# Patient Record
Sex: FEMALE | Race: WHITE | NOT HISPANIC OR LATINO | URBAN - METROPOLITAN AREA
[De-identification: names, ages, dates, MRNs, and addresses within clinical notes are randomized per-mention and may not be internally consistent; named-entity substitution may affect disease eponyms.]

---

## 2020-06-02 ENCOUNTER — OFFICE VISIT (OUTPATIENT)
Dept: URBAN - METROPOLITAN AREA CLINIC 54 | Facility: CLINIC | Age: 50
End: 2020-06-02
Payer: COMMERCIAL

## 2020-06-02 ENCOUNTER — WEB ENCOUNTER (OUTPATIENT)
Dept: URBAN - NONMETROPOLITAN AREA CLINIC 2 | Facility: CLINIC | Age: 50
End: 2020-06-02

## 2020-06-02 DIAGNOSIS — R10.13 EPIGASTRIC ABDOMINAL PAIN: ICD-10-CM

## 2020-06-02 DIAGNOSIS — R63.4 WEIGHT LOSS: ICD-10-CM

## 2020-06-02 DIAGNOSIS — R11.2 NAUSEA & VOMITING: ICD-10-CM

## 2020-06-02 DIAGNOSIS — R19.7 DIARRHEA: ICD-10-CM

## 2020-06-02 PROCEDURE — 99203 OFFICE O/P NEW LOW 30 MIN: CPT | Performed by: INTERNAL MEDICINE

## 2020-06-02 PROCEDURE — 1036F TOBACCO NON-USER: CPT | Performed by: INTERNAL MEDICINE

## 2020-06-02 PROCEDURE — G9903 PT SCRN TBCO ID AS NON USER: HCPCS | Performed by: INTERNAL MEDICINE

## 2020-06-02 RX ORDER — DICYCLOMINE HYDROCHLORIDE 20 MG/1
1 TABLET TABLET ORAL THREE TIMES A DAY
Status: DISCONTINUED | COMMUNITY

## 2020-06-02 RX ORDER — PROMETHAZINE HYDROCHLORIDE 25 MG/1
1 TABLET AS NEEDED TABLET ORAL
Status: DISCONTINUED | COMMUNITY

## 2020-06-02 RX ORDER — ONDANSETRON HYDROCHLORIDE 8 MG/1
1 TABLET AS NEEDED TABLET, FILM COATED ORAL ONCE A DAY
Status: DISCONTINUED | COMMUNITY

## 2020-06-02 RX ORDER — BUSPIRONE HYDROCHLORIDE 10 MG/1
1 TABLET TABLET ORAL TWICE A DAY
Status: DISCONTINUED | COMMUNITY

## 2020-06-02 NOTE — HPI-TODAY'S VISIT:
More than 10 years of epigastric pain, intermittent N/V, 10 lb weight loss. Several months of profuse diarrhea, including nocturnal and incontinence. No bleeding. Prior CT, labs EGD and colonoscopy with [facility], records pending. No fever or cough. General health otherwise OK. Trial of steroids and budesonide in the past, minimally helpful. Unclear if IBD or microscopic colitis or celiac actually confirmed.

## 2020-06-10 ENCOUNTER — TELEPHONE ENCOUNTER (OUTPATIENT)
Dept: URBAN - METROPOLITAN AREA CLINIC 92 | Facility: CLINIC | Age: 50
End: 2020-06-10

## 2020-06-10 RX ORDER — POLYETHYLENE GLYCOL 3350, SODIUM SULFATE, SODIUM CHLORIDE, POTASSIUM CHLORIDE, ASCORBIC ACID, SODIUM ASCORBATE 140-9-5.2G
AS DIRECTED KIT ORAL
Qty: 1 | Refills: 0 | OUTPATIENT
Start: 2020-06-10 | End: 2020-06-11

## 2020-06-24 ENCOUNTER — TELEPHONE ENCOUNTER (OUTPATIENT)
Dept: URBAN - METROPOLITAN AREA SURGERY CENTER 14 | Facility: SURGERY CENTER | Age: 50
End: 2020-06-24

## 2020-08-18 ENCOUNTER — TELEPHONE ENCOUNTER (OUTPATIENT)
Dept: URBAN - METROPOLITAN AREA CLINIC 54 | Facility: CLINIC | Age: 50
End: 2020-08-18

## 2020-08-31 ENCOUNTER — OFFICE VISIT (OUTPATIENT)
Dept: URBAN - METROPOLITAN AREA SURGERY CENTER 14 | Facility: SURGERY CENTER | Age: 50
End: 2020-08-31
Payer: COMMERCIAL

## 2020-08-31 ENCOUNTER — TELEPHONE ENCOUNTER (OUTPATIENT)
Dept: URBAN - METROPOLITAN AREA CLINIC 92 | Facility: CLINIC | Age: 50
End: 2020-08-31

## 2020-08-31 DIAGNOSIS — K29.60 ADENOPAPILLOMATOSIS GASTRICA: ICD-10-CM

## 2020-08-31 DIAGNOSIS — R63.4 ABNORMAL INTENTIONAL WEIGHT LOSS: ICD-10-CM

## 2020-08-31 DIAGNOSIS — K52.9 ACUTE COLITIS: ICD-10-CM

## 2020-08-31 DIAGNOSIS — R19.7 ACUTE DIARRHEA: ICD-10-CM

## 2020-08-31 PROCEDURE — G9937 DIG OR SURV COLSCO: HCPCS | Performed by: INTERNAL MEDICINE

## 2020-08-31 PROCEDURE — 45380 COLONOSCOPY AND BIOPSY: CPT | Performed by: INTERNAL MEDICINE

## 2020-08-31 PROCEDURE — 43239 EGD BIOPSY SINGLE/MULTIPLE: CPT | Performed by: INTERNAL MEDICINE

## 2020-08-31 PROCEDURE — G8907 PT DOC NO EVENTS ON DISCHARG: HCPCS | Performed by: INTERNAL MEDICINE

## 2020-09-10 ENCOUNTER — TELEPHONE ENCOUNTER (OUTPATIENT)
Dept: URBAN - METROPOLITAN AREA CLINIC 54 | Facility: CLINIC | Age: 50
End: 2020-09-10

## 2020-09-14 ENCOUNTER — TELEPHONE ENCOUNTER (OUTPATIENT)
Dept: URBAN - METROPOLITAN AREA CLINIC 54 | Facility: CLINIC | Age: 50
End: 2020-09-14

## 2020-09-21 ENCOUNTER — OFFICE VISIT (OUTPATIENT)
Dept: URBAN - METROPOLITAN AREA SURGERY CENTER 14 | Facility: SURGERY CENTER | Age: 50
End: 2020-09-21

## 2020-09-28 ENCOUNTER — TELEPHONE ENCOUNTER (OUTPATIENT)
Dept: URBAN - METROPOLITAN AREA CLINIC 54 | Facility: CLINIC | Age: 50
End: 2020-09-28

## 2020-10-12 ENCOUNTER — TELEPHONE ENCOUNTER (OUTPATIENT)
Dept: URBAN - METROPOLITAN AREA CLINIC 92 | Facility: CLINIC | Age: 50
End: 2020-10-12

## 2020-10-12 RX ORDER — PROMETHAZINE HYDROCHLORIDE 25 MG/1
1 TABLET AS NEEDED TABLET ORAL
Qty: 60 TABLET

## 2020-10-12 RX ORDER — ONDANSETRON HYDROCHLORIDE 4 MG/1
1 TABLET AS NEEDED TABLET, FILM COATED ORAL TWICE A DAY
Qty: 60

## 2020-10-15 ENCOUNTER — TELEPHONE ENCOUNTER (OUTPATIENT)
Dept: URBAN - METROPOLITAN AREA CLINIC 54 | Facility: CLINIC | Age: 50
End: 2020-10-15

## 2020-10-16 ENCOUNTER — TELEPHONE ENCOUNTER (OUTPATIENT)
Dept: URBAN - METROPOLITAN AREA CLINIC 54 | Facility: CLINIC | Age: 50
End: 2020-10-16

## 2020-10-16 ENCOUNTER — TELEPHONE ENCOUNTER (OUTPATIENT)
Dept: URBAN - METROPOLITAN AREA CLINIC 92 | Facility: CLINIC | Age: 50
End: 2020-10-16

## 2020-10-19 ENCOUNTER — TELEPHONE ENCOUNTER (OUTPATIENT)
Dept: URBAN - METROPOLITAN AREA CLINIC 54 | Facility: CLINIC | Age: 50
End: 2020-10-19

## 2020-12-10 ENCOUNTER — TELEPHONE ENCOUNTER (OUTPATIENT)
Dept: URBAN - METROPOLITAN AREA CLINIC 54 | Facility: CLINIC | Age: 50
End: 2020-12-10

## 2021-09-22 ENCOUNTER — TELEPHONE ENCOUNTER (OUTPATIENT)
Dept: URBAN - METROPOLITAN AREA CLINIC 54 | Facility: CLINIC | Age: 51
End: 2021-09-22

## 2021-10-15 ENCOUNTER — TELEPHONE ENCOUNTER (OUTPATIENT)
Dept: URBAN - METROPOLITAN AREA CLINIC 54 | Facility: CLINIC | Age: 51
End: 2021-10-15

## 2021-10-18 ENCOUNTER — OFFICE VISIT (OUTPATIENT)
Dept: URBAN - METROPOLITAN AREA CLINIC 53 | Facility: CLINIC | Age: 51
End: 2021-10-18
Payer: COMMERCIAL

## 2021-10-18 DIAGNOSIS — K92.2 ACUTE GASTROINTESTINAL BLEEDING: ICD-10-CM

## 2021-10-18 PROCEDURE — 91110 GI TRC IMG INTRAL ESOPH-ILE: CPT | Performed by: INTERNAL MEDICINE

## 2021-10-18 RX ORDER — PROMETHAZINE HYDROCHLORIDE 25 MG/1
1 TABLET AS NEEDED TABLET ORAL
Qty: 60 TABLET | Status: ACTIVE | COMMUNITY

## 2021-10-18 RX ORDER — ONDANSETRON HYDROCHLORIDE 4 MG/1
1 TABLET AS NEEDED TABLET, FILM COATED ORAL TWICE A DAY
Qty: 60 | Status: ACTIVE | COMMUNITY

## 2021-11-11 ENCOUNTER — OFFICE VISIT (OUTPATIENT)
Dept: URBAN - METROPOLITAN AREA CLINIC 54 | Facility: CLINIC | Age: 51
End: 2021-11-11
Payer: COMMERCIAL

## 2021-11-11 ENCOUNTER — TELEPHONE ENCOUNTER (OUTPATIENT)
Dept: URBAN - METROPOLITAN AREA CLINIC 54 | Facility: CLINIC | Age: 51
End: 2021-11-11

## 2021-11-11 ENCOUNTER — OFFICE VISIT (OUTPATIENT)
Dept: URBAN - METROPOLITAN AREA CLINIC 54 | Facility: CLINIC | Age: 51
End: 2021-11-11

## 2021-11-11 ENCOUNTER — WEB ENCOUNTER (OUTPATIENT)
Dept: URBAN - METROPOLITAN AREA CLINIC 54 | Facility: CLINIC | Age: 51
End: 2021-11-11

## 2021-11-11 DIAGNOSIS — R11.2 NAUSEA AND VOMITING, INTRACTABILITY OF VOMITING NOT SPECIFIED, UNSPECIFIED VOMITING TYPE: ICD-10-CM

## 2021-11-11 DIAGNOSIS — K62.89 RECTAL PAIN: ICD-10-CM

## 2021-11-11 DIAGNOSIS — R10.13 EPIGASTRIC PAIN: ICD-10-CM

## 2021-11-11 DIAGNOSIS — R19.7 DIARRHEA, UNSPECIFIED TYPE: ICD-10-CM

## 2021-11-11 PROCEDURE — 99214 OFFICE O/P EST MOD 30 MIN: CPT | Performed by: INTERNAL MEDICINE

## 2021-11-11 RX ORDER — ONDANSETRON HYDROCHLORIDE 4 MG/1
1 TABLET AS NEEDED TABLET, FILM COATED ORAL TWICE A DAY
Qty: 60 | COMMUNITY

## 2021-11-11 RX ORDER — DICYCLOMINE HYDROCHLORIDE 10 MG/1
1 TABLET CAPSULE ORAL THREE TIMES A DAY
Status: ACTIVE | COMMUNITY

## 2021-11-11 RX ORDER — OMEPRAZOLE 40 MG/1
1 CAPSULE 30 MINUTES BEFORE MORNING MEAL CAPSULE, DELAYED RELEASE ORAL ONCE A DAY
Qty: 30 | OUTPATIENT
Start: 2021-11-11

## 2021-11-11 RX ORDER — DIPHENOXYLATE HYDROCHLORIDE AND ATROPINE SULFATE 2.5; .025 MG/1; MG/1
1 TABLET AS NEEDED TABLET ORAL
Qty: 60 | Refills: 1 | OUTPATIENT
Start: 2021-11-11 | End: 2022-01-10

## 2021-11-11 RX ORDER — ONDANSETRON HYDROCHLORIDE 8 MG/1
1 TABLET AS NEEDED TABLET, FILM COATED ORAL TWICE A DAY
Qty: 60

## 2021-11-11 RX ORDER — PROMETHAZINE HYDROCHLORIDE 25 MG/1
1 TABLET AS NEEDED TABLET ORAL
Qty: 60 TABLET | Status: ACTIVE | COMMUNITY

## 2021-11-11 RX ORDER — PROMETHAZINE HYDROCHLORIDE 25 MG/1
1 TABLET AS NEEDED TABLET ORAL
Qty: 60 TABLET | COMMUNITY

## 2021-11-11 RX ORDER — LIDOCAINE 40 MG/G
1 APPLICATION AS NEEDED CREAM TOPICAL THREE TIMES A DAY
OUTPATIENT
Start: 2021-11-11

## 2021-11-11 RX ORDER — BUSPIRONE HYDROCHLORIDE 15 MG/1
1 TABLET TABLET ORAL TWICE A DAY
Status: ACTIVE | COMMUNITY

## 2021-11-11 RX ORDER — DRONABINOL 5 MG/1
3-4 CAPSULES CAPSULE ORAL ONCE A DAY
Status: ACTIVE | COMMUNITY

## 2021-11-11 RX ORDER — ONDANSETRON HYDROCHLORIDE 4 MG/1
1 TABLET AS NEEDED TABLET, FILM COATED ORAL TWICE A DAY
Qty: 60 | Status: ACTIVE | COMMUNITY

## 2021-11-11 NOTE — PHYSICAL EXAM CONSTITUTIONAL:
tearful, well developed, well nourished , in no acute distress , ambulating without difficulty , normal communication ability , visibly upset

## 2021-11-11 NOTE — PHYSICAL EXAM SKIN:
Small, palpable, erythematous lesions on palms of hands that are tender to touch, no jaundice present , good turgor , no masses , no tenderness on palpation

## 2021-11-11 NOTE — PHYSICAL EXAM GASTROINTESTINAL
Abdomen , soft, tenderness to light palpation throughout entire abd, nondistended , no guarding or rigidity , no masses palpable , normal bowel sounds , Liver and Spleen , no hepatomegaly present , no hepatosplenomegaly , liver nontender , spleen not palpable , Rectal , tenderness to examination, normal sphincter tone , no internal hemorrhoids, rectal masses or bleeding present, no obvious anal fissures

## 2021-11-11 NOTE — HPI-TODAY'S VISIT:
6/2/2020: More than 10 years of epigastric pain, intermittent N/V, 10 lb weight loss. Several months of profuse diarrhea, including nocturnal and incontinence. No bleeding. Prior CT, labs EGD and colonoscopy with [facility], records pending. No fever or cough. General health otherwise OK. Trial of steroids and budesonide in the past, minimally helpful. Unclear if IBD or microscopic colitis or celiac actually confirmed.   Follow up 11/11/21: Pt is here for follow up of epigastric pain, n/v, 12 lb weight loss, rectal pain, and >10 episodes of diarrhea per day. 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 The pill cam results have not been reviewed but the other studies were unremarkable. Today pt complains of n/v, diarrhea, fecal incontinence, cramping, night sweats, fatigue, rectal spasms, mouth sores, stabbing epigastric pain, blurred vision, muscle cramps, rashes, and joint pain. Pt is getting neurosurgery in January but states she has to get her platelets up before she will be cleared for surgery. Pt has been to oncologist who found no cause of low platelets. Pt is visibly upset and says she is tired of feeling like this, she thinks she has Crohns and wants to be treated. Pt states she would get a colostomy if it helped. Pt is still having diarrhea > 10 times a day. Taking imodium and zofran every morning without any relief. Taking bentyl and phenergan as needed when symptoms become severe. Denies bloody stools. Admits to rectal pain and spasm as well as fecal incontinence. Pt has constant nausea and vomiting several times a week. Pt reports she has lost 60 lbs since this started. She has last 12 lbs since her last visit with [facility] in 2020. Also complains of rash on hands and feets every time she gets a flare up.

## 2021-11-12 ENCOUNTER — TELEPHONE ENCOUNTER (OUTPATIENT)
Dept: URBAN - METROPOLITAN AREA CLINIC 54 | Facility: CLINIC | Age: 51
End: 2021-11-12

## 2021-11-19 ENCOUNTER — LAB OUTSIDE AN ENCOUNTER (OUTPATIENT)
Dept: URBAN - METROPOLITAN AREA CLINIC 54 | Facility: CLINIC | Age: 51
End: 2021-11-19

## 2021-11-20 LAB
A/G RATIO: 2.4
ALBUMIN: 4.8
ALKALINE PHOSPHATASE: 71
ALT (SGPT): 4
AST (SGOT): 14
BASO (ABSOLUTE): 0.1
BASOS: 1
BILIRUBIN, TOTAL: 0.3
BUN/CREATININE RATIO: 8
BUN: 6
CALCIUM: 9.2
CARBON DIOXIDE, TOTAL: 23
CHLORIDE: 105
CREATININE: 0.76
EGFR IF AFRICN AM: 106
EGFR IF NONAFRICN AM: 92
EOS (ABSOLUTE): 0.3
EOS: 5
GLOBULIN, TOTAL: 2
GLUCOSE: 88
HEMATOCRIT: 39.5
HEMATOLOGY COMMENTS:: (no result)
HEMOGLOBIN: 13.3
IMMATURE CELLS: (no result)
IMMATURE GRANS (ABS): 0
IMMATURE GRANULOCYTES: 0
LYMPHS (ABSOLUTE): 2.7
LYMPHS: 38
MCH: 30.8
MCHC: 33.7
MCV: 91
MONOCYTES(ABSOLUTE): 0.4
MONOCYTES: 5
NEUTROPHILS (ABSOLUTE): 3.6
NEUTROPHILS: 51
NRBC: (no result)
PLATELETS: 126
POTASSIUM: 4
PROTEIN, TOTAL: 6.8
RBC: 4.32
RDW: 12.8
SODIUM: 141
WBC: 7.2

## 2021-11-30 ENCOUNTER — LAB OUTSIDE AN ENCOUNTER (OUTPATIENT)
Dept: URBAN - NONMETROPOLITAN AREA CLINIC 4 | Facility: CLINIC | Age: 51
End: 2021-11-30

## 2021-12-03 ENCOUNTER — OFFICE VISIT (OUTPATIENT)
Dept: URBAN - METROPOLITAN AREA CLINIC 54 | Facility: CLINIC | Age: 51
End: 2021-12-03

## 2021-12-08 LAB — PANCREATIC ELASTASE, FECAL: >500

## 2021-12-10 ENCOUNTER — TELEPHONE ENCOUNTER (OUTPATIENT)
Dept: URBAN - METROPOLITAN AREA CLINIC 54 | Facility: CLINIC | Age: 51
End: 2021-12-10

## 2021-12-15 ENCOUNTER — TELEPHONE ENCOUNTER (OUTPATIENT)
Dept: URBAN - METROPOLITAN AREA CLINIC 54 | Facility: CLINIC | Age: 51
End: 2021-12-15

## 2021-12-22 ENCOUNTER — TELEPHONE ENCOUNTER (OUTPATIENT)
Dept: URBAN - METROPOLITAN AREA CLINIC 54 | Facility: CLINIC | Age: 51
End: 2021-12-22

## 2022-01-19 ENCOUNTER — OFFICE VISIT (OUTPATIENT)
Dept: URBAN - METROPOLITAN AREA CLINIC 54 | Facility: CLINIC | Age: 52
End: 2022-01-19

## 2022-02-23 ENCOUNTER — CLAIMS CREATED FROM THE CLAIM WINDOW (OUTPATIENT)
Dept: URBAN - METROPOLITAN AREA CLINIC 54 | Facility: CLINIC | Age: 52
End: 2022-02-23
Payer: COMMERCIAL

## 2022-02-23 ENCOUNTER — DASHBOARD ENCOUNTERS (OUTPATIENT)
Age: 52
End: 2022-02-23

## 2022-02-23 DIAGNOSIS — R10.9 ABDOMINAL PAIN: ICD-10-CM

## 2022-02-23 DIAGNOSIS — R11.2 NAUSEA & VOMITING: ICD-10-CM

## 2022-02-23 DIAGNOSIS — R19.7 DIARRHEA: ICD-10-CM

## 2022-02-23 DIAGNOSIS — R63.4 WEIGHT LOSS: ICD-10-CM

## 2022-02-23 DIAGNOSIS — R15.9 INCONTINENCE OF FECES, UNSPECIFIED FECAL INCONTINENCE TYPE: ICD-10-CM

## 2022-02-23 PROCEDURE — 99213 OFFICE O/P EST LOW 20 MIN: CPT | Performed by: INTERNAL MEDICINE

## 2022-02-23 RX ORDER — PROMETHAZINE HYDROCHLORIDE 25 MG/1
1 TABLET AS NEEDED TABLET ORAL
Qty: 60 TABLET | Status: ACTIVE | COMMUNITY

## 2022-02-23 RX ORDER — DICYCLOMINE HYDROCHLORIDE 10 MG/1
1 TABLET CAPSULE ORAL THREE TIMES A DAY
Status: ACTIVE | COMMUNITY

## 2022-02-23 RX ORDER — OMEPRAZOLE 40 MG/1
1 CAPSULE 30 MINUTES BEFORE MORNING MEAL CAPSULE, DELAYED RELEASE ORAL ONCE A DAY
Qty: 30 | Status: ON HOLD | COMMUNITY
Start: 2021-11-11

## 2022-02-23 RX ORDER — LIDOCAINE 40 MG/G
1 APPLICATION AS NEEDED CREAM TOPICAL THREE TIMES A DAY
Status: ACTIVE | COMMUNITY
Start: 2021-11-11

## 2022-02-23 RX ORDER — DRONABINOL 5 MG/1
3-4 CAPSULES CAPSULE ORAL ONCE A DAY
Status: ACTIVE | COMMUNITY

## 2022-02-23 RX ORDER — ONDANSETRON HYDROCHLORIDE 4 MG/1
1 TABLET AS NEEDED TABLET, FILM COATED ORAL TWICE A DAY
Qty: 60 | Status: ACTIVE | COMMUNITY

## 2022-02-23 RX ORDER — BUSPIRONE HYDROCHLORIDE 10 MG/1
1 TABLET TABLET ORAL TWICE A DAY
Status: ACTIVE | COMMUNITY

## 2022-02-23 NOTE — HPI-TODAY'S VISIT:
2/23/22   This is a 51-year-old female with years of chronic abdominal complaints without a specific diagnosis.  She continues to have generalized abdominal pain and diarrhea on a daily basis 2-5 loose stools per day beginning in the morning before eating anything.  The pain is mild to moderate and occasionally severe it is not always clearly associated with food intake.  She has intermittent nausea and has episodes of vomiting once or twice per week.  She has lost weight although is in a slow manner.  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  She uses Imodium usually 2/day and also takes Zofran for nausea.  She has had extensive diagnostic work-up as indicated below most recently negative pancreatic elastase and a negative SIBO test.  Prior endoscopic examinations including biopsies have been nonrevealing.

## 2022-02-23 NOTE — HPI-TODAY'S VISIT:
6/2/2020: More than 10 years of epigastric pain, intermittent N/V, 10 lb weight loss. Several months of profuse diarrhea, including nocturnal and incontinence. No bleeding. Prior CT, labs EGD and colonoscopy with [facility], records pending. No fever or cough. General health otherwise OK. Trial of steroids and budesonide in the past, minimally helpful. Unclear if IBD or microscopic colitis or celiac actually confirmed.   Follow up 11/11/21: Pt is here for follow up of epigastric pain, n/v, 12 lb weight loss, rectal pain, and >10 episodes of diarrhea per day. 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 The pill cam results have not been reviewed but the other studies were unremarkable. Today pt complains of n/v, diarrhea, fecal incontinence, cramping, night sweats, fatigue, rectal spasms, mouth sores, stabbing epigastric pain, blurred vision, muscle cramps, rashes, and joint pain. Pt is getting neurosurgery in January but states she has to get her platelets up before she will be cleared for surgery. Pt has been to oncologist who found no cause of low platelets. Pt is visibly upset and says she is tired of feeling like this, she thinks she has Crohns and wants to be treated. Pt states she would get a colostomy if it helped. Pt is still having diarrhea > 10 times a day. Taking imodium and zofran every morning without any relief. Taking bentyl and phenergan as needed when symptoms become severe. Denies bloody stools. Admits to rectal pain and spasm as well as fecal incontinence. Pt has constant nausea and vomiting several times a week. Pt reports she has lost 60 lbs since this started. She has last 12 lbs since her last visit with [facility] in 2020. Also complains of rash on hands and feets every time she gets a flare up.

## 2022-02-24 ENCOUNTER — TELEPHONE ENCOUNTER (OUTPATIENT)
Dept: URBAN - METROPOLITAN AREA CLINIC 54 | Facility: CLINIC | Age: 52
End: 2022-02-24

## 2022-03-01 ENCOUNTER — TELEPHONE ENCOUNTER (OUTPATIENT)
Dept: URBAN - METROPOLITAN AREA CLINIC 54 | Facility: CLINIC | Age: 52
End: 2022-03-01

## 2022-03-03 LAB
COPROPOR(CP)III,24HR: 32
COPROPORPH(CP)I,24HR: 14
COPROPORPHYRIN (CP) I: 9
COPROPORPHYRIN (CP) III: 21
HEPTACARB(7-CP),24HR: 3
HEPTACARBOXYL (7-CP): 2
HEXACARB(6-CP),24HR: <2
HEXACARBOXYL (6-CP): <1
Lab: 8
PENTACARB(5-CP),24HR: <2
PENTACARBOXYL (5-CP): <1
UROPORPHYRINS (UP): 5

## 2022-03-08 ENCOUNTER — TELEPHONE ENCOUNTER (OUTPATIENT)
Dept: URBAN - METROPOLITAN AREA CLINIC 54 | Facility: CLINIC | Age: 52
End: 2022-03-08

## 2022-03-15 PROBLEM — 21522001 ABDOMINAL PAIN: Status: ACTIVE | Noted: 2022-02-23

## 2022-03-15 PROBLEM — 816160009 WEIGHT LOSS: Status: ACTIVE | Noted: 2022-02-23

## 2022-03-15 PROBLEM — 72042002: Status: ACTIVE | Noted: 2021-11-11

## 2022-03-15 PROBLEM — 62315008 DIARRHEA: Status: ACTIVE | Noted: 2022-02-23

## 2022-03-15 PROBLEM — 16932000 NAUSEA AND VOMITING: Status: ACTIVE | Noted: 2022-02-23

## 2022-04-07 ENCOUNTER — TELEPHONE ENCOUNTER (OUTPATIENT)
Dept: URBAN - METROPOLITAN AREA CLINIC 23 | Facility: CLINIC | Age: 52
End: 2022-04-07

## 2022-04-12 ENCOUNTER — TELEPHONE ENCOUNTER (OUTPATIENT)
Dept: URBAN - METROPOLITAN AREA CLINIC 23 | Facility: CLINIC | Age: 52
End: 2022-04-12

## 2022-05-26 ENCOUNTER — TELEPHONE ENCOUNTER (OUTPATIENT)
Dept: URBAN - METROPOLITAN AREA CLINIC 54 | Facility: CLINIC | Age: 52
End: 2022-05-26

## 2022-05-26 RX ORDER — DICYCLOMINE HYDROCHLORIDE 10 MG/1
1 TABLET CAPSULE ORAL
Qty: 120 TABLET | Refills: 3

## 2022-05-26 RX ORDER — DRONABINOL 5 MG/1
7-8 CAPSULES CAPSULE ORAL ONCE A DAY
Qty: 240 | Refills: 3

## 2022-05-26 RX ORDER — ONDANSETRON HYDROCHLORIDE 8 MG/1
1 TABLET AS NEEDED TABLET, FILM COATED ORAL TWICE A DAY
Qty: 60 | Refills: 3

## 2022-06-01 ENCOUNTER — TELEPHONE ENCOUNTER (OUTPATIENT)
Dept: URBAN - METROPOLITAN AREA CLINIC 54 | Facility: CLINIC | Age: 52
End: 2022-06-01

## 2022-06-01 RX ORDER — DRONABINOL 5 MG/1
7-8 CAPSULES CAPSULE ORAL ONCE A DAY
Qty: 240 | Refills: 3

## 2022-06-01 RX ORDER — DICYCLOMINE HYDROCHLORIDE 10 MG/1
1 TABLET CAPSULE ORAL
Qty: 120 TABLET | Refills: 3

## 2022-06-01 RX ORDER — ONDANSETRON HYDROCHLORIDE 8 MG/1
1 TABLET AS NEEDED TABLET, FILM COATED ORAL TWICE A DAY
Qty: 60 | Refills: 3

## 2022-06-03 ENCOUNTER — WEB ENCOUNTER (OUTPATIENT)
Dept: URBAN - METROPOLITAN AREA CLINIC 54 | Facility: CLINIC | Age: 52
End: 2022-06-03

## 2022-12-09 ENCOUNTER — TELEPHONE ENCOUNTER (OUTPATIENT)
Dept: URBAN - METROPOLITAN AREA CLINIC 54 | Facility: CLINIC | Age: 52
End: 2022-12-09

## 2024-05-17 ENCOUNTER — OFFICE VISIT (OUTPATIENT)
Dept: URBAN - NONMETROPOLITAN AREA CLINIC 2 | Facility: CLINIC | Age: 54
End: 2024-05-17